# Patient Record
Sex: FEMALE | Race: WHITE | HISPANIC OR LATINO | URBAN - METROPOLITAN AREA
[De-identification: names, ages, dates, MRNs, and addresses within clinical notes are randomized per-mention and may not be internally consistent; named-entity substitution may affect disease eponyms.]

---

## 2023-11-30 ENCOUNTER — TELEPHONE (OUTPATIENT)
Dept: FAMILY MEDICINE CLINIC | Facility: CLINIC | Age: 43
End: 2023-11-30

## 2023-11-30 NOTE — TELEPHONE ENCOUNTER
VM on appt line:    Yes, good morning. My name is Sushila Mccall and I work with the [employer] and I need to rebook a annual exam for [employer]. Date of birth 9/14/2024. I'm sorry. Whoops, I messed up her birthday 1980. Sorry about that. And please give me a call back at [phone] to be able to rebook her appointment. Thank you. Spoke with Sushila Mccall, scheduled appt.

## 2024-01-30 ENCOUNTER — OFFICE VISIT (OUTPATIENT)
Dept: FAMILY MEDICINE CLINIC | Facility: CLINIC | Age: 44
End: 2024-01-30

## 2024-01-30 VITALS
BODY MASS INDEX: 25.99 KG/M2 | HEART RATE: 88 BPM | DIASTOLIC BLOOD PRESSURE: 60 MMHG | TEMPERATURE: 98.4 F | WEIGHT: 156 LBS | OXYGEN SATURATION: 97 % | HEIGHT: 65 IN | SYSTOLIC BLOOD PRESSURE: 118 MMHG | RESPIRATION RATE: 18 BRPM

## 2024-01-30 DIAGNOSIS — Z59.89 UNINSURED: ICD-10-CM

## 2024-01-30 DIAGNOSIS — R10.2 SUPRAPUBIC DISCOMFORT: ICD-10-CM

## 2024-01-30 DIAGNOSIS — Z01.419 ENCOUNTER FOR ANNUAL ROUTINE GYNECOLOGICAL EXAMINATION: Primary | ICD-10-CM

## 2024-01-30 LAB
SL AMB  POCT GLUCOSE, UA: NORMAL
SL AMB LEUKOCYTE ESTERASE,UA: 25
SL AMB POCT BILIRUBIN,UA: NEGATIVE
SL AMB POCT BLOOD,UA: ABNORMAL
SL AMB POCT CLARITY,UA: CLEAR
SL AMB POCT COLOR,UA: YELLOW
SL AMB POCT KETONES,UA: 15
SL AMB POCT NITRITE,UA: NEGATIVE
SL AMB POCT PH,UA: 5
SL AMB POCT SPECIFIC GRAVITY,UA: 1.02
SL AMB POCT URINE PROTEIN: NEGATIVE
SL AMB POCT UROBILINOGEN: NORMAL

## 2024-01-30 PROCEDURE — 99396 PREV VISIT EST AGE 40-64: CPT | Performed by: FAMILY MEDICINE

## 2024-01-30 PROCEDURE — 81002 URINALYSIS NONAUTO W/O SCOPE: CPT | Performed by: FAMILY MEDICINE

## 2024-01-30 PROCEDURE — G0145 SCR C/V CYTO,THINLAYER,RESCR: HCPCS

## 2024-01-30 PROCEDURE — G0476 HPV COMBO ASSAY CA SCREEN: HCPCS

## 2024-01-30 SDOH — ECONOMIC STABILITY - INCOME SECURITY: OTHER PROBLEMS RELATED TO HOUSING AND ECONOMIC CIRCUMSTANCES: Z59.89

## 2024-01-30 NOTE — PROGRESS NOTES
Assessment and Plan:    1. Encounter for annual routine gynecological examination  Assessment & Plan:  Normal GYN and breast exams today, emphasized the importance of annual GYN examination.  Pap with HPV performed today, await results.  Advised yearly mammograms. Rx for mammogram given.  Colon cancer screening with a colonoscopy is recommended starting at age 45 and reviewed benefits. Rx not indicated at this time.  Counseling on healthy weight: Encourage 30-40 min weight bearing exercise most days of week  Encourage at least 1200 mg calcium citrate + 2000 IUs vitamin D3 divided through diet and supplement throughout the day  I reviewed the patient’s risk factors for osteoporosis and ordered a DEXA scan if applicable  All questions have been answered to her satisfaction    Orders:  -     Mammo screening bilateral w 3d & cad; Future  -     Liquid-based pap, screening  -     HPV High Risk    2. Suprapubic discomfort  Assessment & Plan:  Pain starts in the back and moves to suprapubic area, POCT UA unremarkable, has hx of kidney stones. No CVA tenderness today  Possible kidney stone vs GYN pathology  Advised increased hydration  Would recommend imaging but patient uninsured- will refer to social work  Advised to call the office if no improvement, has follow up in 4 weeks  Consider flomax    Orders:  -     POCT urine dip    3. Uninsured      Subjective      Ann Marie Brewer is a 43 y.o. female who presents for annual GYN exam. Patient is post menopausal, underwent early menopause at age of 37yrs. Patient denies post-menopausal bleeding, spotting, or discharge.    GYN:   Denies vaginal discharge, labial erythema or lesions, dyspareunia.   Contraception: condoms.   Patient is sexually active with 1 partner.   Last pap smear was 5 yrs ago. Results were negative.   No gynecologic surgeries, only .    OB:    OB History   No obstetric history on file.        :   Denies dysuria, urinary frequency or urgency.   Reports  "pelvic discomfort that started 2-3 weeks ago.  Pain described as colicky that radiates to the front.  Has Hx of kidney stones.   Denies hematuria, flank pain, incontinence.    Breast:   Denies breast mass, discharge and overlying skin changes such as dimpling, reddening, nipple retraction.   Patient performs self breast awareness.   Patient does not have a family history of breast, endometrial, or ovarian ca.       Review of Systems  As stated in HPI.     Objective      /60 (BP Location: Left arm, Patient Position: Sitting, Cuff Size: Standard)   Pulse 88   Temp 98.4 °F (36.9 °C) (Tympanic)   Resp 18   Ht 5' 5\" (1.651 m)   Wt 70.8 kg (156 lb)   SpO2 97%   BMI 25.96 kg/m²     General:   alert and oriented, in no acute distress   Heart: regular rate and rhythm, S1, S2 normal, no murmur, click, rub or gallop   Lungs: clear to auscultation bilaterally   Abdomen: soft and tenderness in the lower abdomen/suprapubic area. No CVA tenderness.   Vulva: normal   Vagina: normal mucosa   Cervix: no cervical motion tenderness   Uterus: normal size   Adnexa: no mass, fullness, tenderness        Viji Flowers MD  Family Medicine PGY-3      "

## 2024-01-31 ENCOUNTER — TELEPHONE (OUTPATIENT)
Dept: FAMILY MEDICINE CLINIC | Facility: CLINIC | Age: 44
End: 2024-01-31

## 2024-01-31 LAB
HPV HR 12 DNA CVX QL NAA+PROBE: NEGATIVE
HPV16 DNA CVX QL NAA+PROBE: NEGATIVE
HPV18 DNA CVX QL NAA+PROBE: NEGATIVE

## 2024-02-02 LAB
LAB AP GYN PRIMARY INTERPRETATION: NORMAL
Lab: NORMAL

## 2024-02-04 PROBLEM — R10.2 SUPRAPUBIC DISCOMFORT: Status: ACTIVE | Noted: 2024-02-04

## 2024-02-04 PROBLEM — Z01.419 ENCOUNTER FOR ANNUAL ROUTINE GYNECOLOGICAL EXAMINATION: Status: ACTIVE | Noted: 2024-02-04

## 2024-02-04 NOTE — ASSESSMENT & PLAN NOTE
Normal GYN and breast exams today, emphasized the importance of annual GYN examination.  Pap with HPV performed today, await results.  Advised yearly mammograms. Rx for mammogram given.  Colon cancer screening with a colonoscopy is recommended starting at age 45 and reviewed benefits. Rx not indicated at this time.  Counseling on healthy weight: Encourage 30-40 min weight bearing exercise most days of week  Encourage at least 1200 mg calcium citrate + 2000 IUs vitamin D3 divided through diet and supplement throughout the day  I reviewed the patient’s risk factors for osteoporosis and ordered a DEXA scan if applicable  All questions have been answered to her satisfaction

## 2024-02-04 NOTE — ASSESSMENT & PLAN NOTE
Pain starts in the back and moves to suprapubic area, POCT UA unremarkable, has hx of kidney stones. No CVA tenderness today  Possible kidney stone vs GYN pathology  Advised increased hydration  Would recommend imaging but patient uninsured- will refer to social work  Advised to call the office if no improvement, has follow up in 4 weeks  Consider flomax

## 2024-02-21 PROBLEM — Z01.419 ENCOUNTER FOR ANNUAL ROUTINE GYNECOLOGICAL EXAMINATION: Status: RESOLVED | Noted: 2024-02-04 | Resolved: 2024-02-21

## 2024-05-07 ENCOUNTER — OFFICE VISIT (OUTPATIENT)
Age: 44
End: 2024-05-07

## 2024-05-07 VITALS
OXYGEN SATURATION: 99 % | DIASTOLIC BLOOD PRESSURE: 62 MMHG | SYSTOLIC BLOOD PRESSURE: 108 MMHG | WEIGHT: 157.8 LBS | HEIGHT: 66 IN | BODY MASS INDEX: 25.36 KG/M2 | HEART RATE: 78 BPM | TEMPERATURE: 98.4 F

## 2024-05-07 DIAGNOSIS — G43.E09 CHRONIC MIGRAINE WITH AURA WITHOUT STATUS MIGRAINOSUS, NOT INTRACTABLE: ICD-10-CM

## 2024-05-07 DIAGNOSIS — R10.2 PELVIC PAIN: ICD-10-CM

## 2024-05-07 DIAGNOSIS — R53.83 OTHER FATIGUE: ICD-10-CM

## 2024-05-07 DIAGNOSIS — N95.1 MENOPAUSAL SYMPTOMS: Primary | ICD-10-CM

## 2024-05-07 PROCEDURE — 99213 OFFICE O/P EST LOW 20 MIN: CPT | Performed by: FAMILY MEDICINE

## 2024-05-07 RX ORDER — TOPIRAMATE 25 MG/1
25 TABLET ORAL
Qty: 60 TABLET | Refills: 1 | Status: SHIPPED | OUTPATIENT
Start: 2024-05-07

## 2024-05-07 RX ORDER — TOPIRAMATE 50 MG/1
50 TABLET, FILM COATED ORAL
Qty: 60 TABLET | Refills: 1 | Status: SHIPPED | OUTPATIENT
Start: 2024-05-07 | End: 2024-05-07

## 2024-05-07 NOTE — ASSESSMENT & PLAN NOTE
Mainly reports headache, sweating, hot flashes, mood swings. Has hx of migraines in the past, patient feels her other symptoms are exacerbating her headaches. She also reports vaginal dryness and discomfort during intercourse. Pt had early menopause at 37yrs. Had ocassional Sx but have been worse for the past 2 months.  Recommended vaginal moisturizer such as replens  Recommended use of lubricants before intercourse  Would avoid estrogen containing products due to migraine with aura  Discussed SSRIs for menopause Tx, patient will consider

## 2024-05-07 NOTE — PATIENT INSTRUCTIONS
REPLENS VAGINAL MOISTURIZER [facility]    La menopausia   LO QUE NECESITA SABER:   ¿Qué es la menopausia? La menopausia es wenceslao fase de transición normal en la shadi de wenceslao persona cuando cesa por definitivo avilez menstruación. Se considera que está en la menopausia cuando no ha tenido la menstruación juventino un año completo después de los 40 años. La menopausia generalmente ocurre entre los 47 y 53 años de edad. La perimenopausia o pre-menopausia es un estado previo a la menopausia que puede causar signos y síntomas parecidos a la menopausia. La perimenopausia puede comenzar 4 años antes que la menopausia.       ¿Qué causa la menopausia? La menopausia comienza cuando los ovarios disminuyen la producción de hormonas femeninas conocidas chelita estrógeno y progesterona. Después de la menopausia, ya no puede quedar embarazada. Cualquiera de los siguientes factores puede desencadenar la menopausia o menopausia precoz:  Cirugía, incluso wenceslao histerectomía u ovariectomía    Antecedentes familiares de menopausia precoz    Fumar    Quimioterapia o terapia de radiación    Anomalías cromosómicas, incluyendo síndrome de [place] y síndrome de X frágil    Insuficiencia ovárica prematura (los ovarios estrella de producir óvulos antes de los 40 años)    ¿Cuáles son los signos y síntomas de la menopausia?  Ciclos menstruales irregulares con sangrado vaginal seguido de sangrado disminuido, hasta que se detenga.    Sofocos (sentirse acalorada, sudorosa y ponerse ed).    Cambios vaginales chelita mayor sequedad    Cambios en el estado de ánimo chelita ansiedad, depresión o falta de interés sexual.    Problemas para dormir, dolor en las articulaciones, franco de shakila.    Uñas quebradizas, vello en la barbilla o el pecho, donde normalmente no se presenta.    Disminución del tamaño de los senos y cambios en la textura de la piel.    Aumento de peso    ¿Cómo se trata o maneja la menopausia?  La terapia de reemplazo hormonal (TRH) es un medicamento que  "reemplaza el bajo nivel de soto hormonas. La TRH contiene estrógeno y algunas veces progestina.    La TRH tiene varios beneficios. La TRH ayuda a prevenir la osteoporosis, lo cual disminuye avilez riesgo de fracturas en los huesos. Esta terapia también la protege a usted del cáncer colorrectal.    La TRH también tiene algunos riesgos. La TRH aumenta el riesgo de padecer cáncer de mama, coágulos de adrián, enfermedades cardíacas, ataques cardíacos o derrames cerebrales. Si tiene 65 años o más, la TRH también puede aumentar el riesgo de demencia. El riesgo de cáncer de útero o de endometrio, enfermedad de la vesícula biliar e incontinencia urinaria es mayor si se sulma wenceslao TRH con estrógenos.    Sobrellevar los sofocos. Los sofocos, llamados a veces \"calores o bochornos\", son periodos cortos juventino los cuales usted siente mucho calor, suda y se pone ed. Los sofocos pueden durar un par de segundos hasta varios minutos. Pueden suceder muchas veces juventino el día y son más comunes de noche. Vístase en capas para que se pueda quitar alguna prenda con facilidad y así poder refrescarse juventino un sofoco. Las bebidas frías también pueden ayudar. Los medicamentos no hormonales pueden ayudar a aliviar o prevenir los sofocos. Por ejemplo, ciertos antidepresivos, medicamentos para los nervios y medicamentos para la presión arterial selena.    Reduzca la sequedad vaginal usando cremas vaginales de venta sin receta. La sequedad vaginal puede causarle dolor o molestias juventino el acto sexual. Use solo las cremas especiales para uso vaginal. No use gel de petróleo. Es posible que deba colocarse estrógenos en crema dentro y alrededor de la vagina. La crema de estrógeno puede reducir la sequedad vaginal y disminuir el riesgo de contraer infecciones vaginales.    Si usted no quiere quedar embarazada debe seguir usando los anticonceptivos juventino la menopausia. Usted necesita seguir utilizando anticonceptivos hasta que haya pasado 1 año de " javier terminado soto períodos. Consulte con avilez médico para que le indique cuando puede dejar de utilizar anticonceptivos para prevenir un embarazo.    ¿Cómo puedo seguir un estilo de shadi saludable juventino y después de la menopausia? Después de la menopausia, aumenta avilez riesgo de presentar enfermedades del corazón y pérdida de densidad ósea. Consulte sobre las siguientes y otras formas para mantenerse saludable:  Realice actividad física con regularidad. El ejercicio le ayuda a mantener un peso saludable. Realizar wenceslao actividad física también puede ayudarle a controlar avilez presión arterial y los niveles de colesterol. Incluya ejercicios de resistencia o levantamiento de pesas para fortalecer los huesos. Se recomienda hacer ejercicio con peso juventino al menos 30 minutos, 3 veces por semana. Pregunte a avilez médico acerca del mejor plan de ejercicio para usted.         Consuma alimentos saludables y variados. Debe incluir frutas, verduras, granos enteros (pan integral, pasta y cereales) productos lácteos descremados, y alimentos con proteínas (frijoles, carne de aves y pescado). Limite los alimentos altos en sodio (sal). Solicite con avilez médico más información sobre un plan de comidas que sea el adecuado para usted.         No fume. Si usted fuma, nunca es demasiado tarde para dejar de hacerlo. Si usted fuma, es más probable que tenga un ataque cardíaco, enfermedad pulmonar, coágulos sanguíneos o cáncer. Solicite información a avilez médico si usted necesita ayuda para dejar de fumar.    [place] suplementos según las indicaciones. Usted podría necesitar calcio y vitamina D adicionales para prevenir la osteoporosis.            Limite el consumo de alcohol y cafeína. El alcohol y la cafeína pueden empeorar soto síntomas.    ¿Cuándo dereck llamar a mi médico?  Usted tiene sangrado vaginal después de la menopausia.    Usted tiene preguntas o inquietudes acerca de avilez condición o cuidado.    ACUERDOS SOBRE AVILEZ CUIDADO:   Usted tiene el  derecho de ayudar a planear avilez cuidado. Aprenda todo lo que pueda sobre avilez condición y chelita darle tratamiento. Discuta soto opciones de tratamiento con soto médicos para decidir el cuidado que usted desea recibir. Usted siempre tiene el derecho de rechazar el tratamiento.Esta información es sólo para uso en educación. Avilez intención no es darle un consejo médico sobre enfermedades o tratamientos. Colsulte con avilez médico, enfermera o farmacéutico antes de seguir cualquier régimen médico para saber si es seguro y efectivo para usted.  © Copyright [employer] 2023 Information is for End User's use only and may not be sold, redistributed or otherwise used for commercial purposes.

## 2024-05-07 NOTE — ASSESSMENT & PLAN NOTE
Pelvic pain of several months duration, also reports dyspareunia. Patient uninsured will begin [facility]ason care application.

## 2024-05-07 NOTE — PROGRESS NOTES
Family Medicine Office Visit  Ann Marie Brewer 43 y.o. female   MRN: [id number] : 1980  ENCOUNTER: 2024    Assessment and Plan     1. Menopausal symptoms  Assessment & Plan:  Mainly reports headache, sweating, hot flashes, mood swings. Has hx of migraines in the past, patient feels her other symptoms are exacerbating her headaches. She also reports vaginal dryness and discomfort during intercourse. Pt had early menopause at 37yrs. Had ocassional Sx but have been worse for the past 2 months.  Recommended vaginal moisturizer such as replens  Recommended use of lubricants before intercourse  Would avoid estrogen containing products due to migraine with aura  Discussed SSRIs for menopause Tx, patient will consider        2. Chronic migraine with aura without status migrainosus, not intractable  Assessment & Plan:  Hx migraine, worsening over the past 2 months. + aura. Tylenol and ibuprofen help but pain returns after few hrs, Headache is constant, has had >8 headache days per month.   Trial of topamax 25mg HS  RTO in 2 weeks, dose adjustment at that time if appropriate    Orders:  -     topiramate (Topamax) 25 mg tablet; Take 1 tablet (25 mg total) by mouth daily at bedtime    3. Other fatigue  -     CBC and differential; Future  -     Comprehensive metabolic panel; Future  -     TSH, 3rd generation with Free T4 reflex; Future    4. Pelvic pain  Assessment & Plan:  Pelvic pain of several months duration, also reports dyspareunia. Patient uninsured will begin jason care application.       Orders:  -     US pelvis complete non OB; Future; Expected date: 2024        Return in about 3 weeks (around 2024), or headache follow up 2-3 weeks with Dr. Burnett.      Associated orders were discussed and explained to the patient, they expressed understanding and acceptance with A/P. Patient will call the office if any further questions/concerns.     Assessment and plan was discussed with attending  "physician    Chief Complaint     Chief Complaint   Patient presents with    OTHER     Menopause Like Sx's        History of Present Illness       Ann Marie Brewer is a 43 y.o. female who presents for evaluation of menopausal symptoms. The patient is sexually active. Patient is post menopausal, underwent early menopause at age of 37yrs. Additional symptoms include headache, insomnia, moodiness, pelvic pain, and fatigue and hot flashes .   Last Gyn testing: last pap normal.  The patient is not taking hormone therapy.       No current outpatient medications on file prior to visit.       Review of Systems     Review of Systems   Constitutional:  Negative for chills and fever.   Respiratory:  Negative for cough and shortness of breath.    Cardiovascular:  Negative for chest pain.   Gastrointestinal:  Negative for abdominal pain.   Genitourinary:  Positive for pelvic pain. Negative for difficulty urinating and vaginal discharge.   Skin:  Negative for rash.   Neurological:  Negative for headaches.       Objective     /62 (BP Location: Right arm, Patient Position: Sitting, Cuff Size: Adult)   Pulse 78   Temp 98.4 °F (36.9 °C) (Tympanic)   Ht 5' 6\" (1.676 m)   Wt 71.6 kg (157 lb 12.8 oz)   LMP  (LMP Unknown)   SpO2 99%   BMI 25.47 kg/m²     Physical Exam  Vitals reviewed.   Constitutional:       General: She is not in acute distress.     Appearance: Normal appearance.   Cardiovascular:      Rate and Rhythm: Normal rate and regular rhythm.      Heart sounds: Normal heart sounds.   Pulmonary:      Effort: Pulmonary effort is normal. No respiratory distress.      Breath sounds: Normal breath sounds.   Abdominal:      Palpations: Abdomen is soft.      Tenderness: There is no abdominal tenderness.   Musculoskeletal:      Right lower leg: No edema.      Left lower leg: No edema.   Neurological:      Mental Status: She is alert.           Viji Burnett MD  Family Medicine PGY-3  [facility]         Parts of " this note were dictated using MASYM III dictation software and may have sounds-like errors due to variation in pronunciation.

## 2024-05-07 NOTE — ASSESSMENT & PLAN NOTE
Hx migraine, worsening over the past 2 months. + aura. Tylenol and ibuprofen help but pain returns after few hrs, Headache is constant, has had >8 headache days per month.   Trial of topamax 25mg HS  RTO in 2 weeks, dose adjustment at that time if appropriate

## 2024-06-24 ENCOUNTER — OFFICE VISIT (OUTPATIENT)
Age: 44
End: 2024-06-24

## 2024-06-24 VITALS
WEIGHT: 157 LBS | TEMPERATURE: 98 F | SYSTOLIC BLOOD PRESSURE: 119 MMHG | RESPIRATION RATE: 16 BRPM | BODY MASS INDEX: 25.23 KG/M2 | DIASTOLIC BLOOD PRESSURE: 76 MMHG | HEIGHT: 66 IN | OXYGEN SATURATION: 97 % | HEART RATE: 73 BPM

## 2024-06-24 DIAGNOSIS — M25.571 ACUTE RIGHT ANKLE PAIN: Primary | ICD-10-CM

## 2024-06-24 DIAGNOSIS — G43.E09 CHRONIC MIGRAINE WITH AURA WITHOUT STATUS MIGRAINOSUS, NOT INTRACTABLE: ICD-10-CM

## 2024-06-24 PROCEDURE — 99213 OFFICE O/P EST LOW 20 MIN: CPT | Performed by: FAMILY MEDICINE

## 2024-06-24 RX ORDER — MAGNESIUM 200 MG
1 TABLET ORAL
Qty: 30 TABLET | Refills: 5 | Status: SHIPPED | OUTPATIENT
Start: 2024-06-24

## 2024-06-24 NOTE — ASSESSMENT & PLAN NOTE
Improving with topamax every other day. Cannot increase topamax dose due to symptoms of tiredness and dizziness.   Will add Mg and riboflavin to regimen.   F/U in 2 months

## 2024-06-24 NOTE — PROGRESS NOTES
Family Medicine Office Visit  Ann Marie Brewer 43 y.o. female   MRN: [id number] : 1980  ENCOUNTER: 2024    Assessment and Plan     1. Acute right ankle pain  Comments:  Patient fell and hurt ankle, per [place] rules patient qualifies for xray so we will send it today. Continue using ankle brace, RICE  Orders:  -     XR ankle 2 vw right; Future; Expected date: 2024  2. Chronic migraine with aura without status migrainosus, not intractable  Assessment & Plan:  Improving with topamax every other day. Cannot increase topamax dose due to symptoms of tiredness and dizziness.   Will add Mg and riboflavin to regimen.   F/U in 2 months  Orders:  -     Magnesium 200 MG TABS; Take 1 tablet (200 mg total) by mouth daily at bedtime  -     Riboflavin 50 MG TABS; Take 1 tablet (50 mg total) by mouth daily at bedtime    Associated orders were discussed and explained to the patient, they expressed understanding and acceptance with A/P. Patient will call the office if any further questions/concerns.     Assessment and plan was discussed with attending physician    Chief Complaint     Chief Complaint   Patient presents with    Follow-up     Headaches are improving       History of Present Illness     Ann Marie Brewer is a 43 y.o.-year-old female who presents today for headache follow up.  She was taking topamax nightly with improvement of headaches but it was causing fatigue and dizziness so she decreased the medication to every other day. She reports improvement in headaches and sleep despite decreasing to every other day dosing. She was feeling dizzy one day and fell hurting her ankle. She can ambulate and bear weight but there is pain with movement.       Current Outpatient Medications on File Prior to Visit   Medication Sig    topiramate (Topamax) 25 mg tablet Take 1 tablet (25 mg total) by mouth daily at bedtime       Review of Systems     Review of Systems   Constitutional:  Negative for chills and fever.  "  Respiratory:  Negative for cough and shortness of breath.    Cardiovascular:  Negative for chest pain.   Gastrointestinal:  Negative for abdominal pain.   Genitourinary:  Negative for difficulty urinating.   Skin:  Negative for rash.   Neurological:  Positive for headaches (improving).       Objective     /76 (BP Location: Right arm, Patient Position: Sitting, Cuff Size: Adult)   Pulse 73   Temp 98 °F (36.7 °C) (Tympanic)   Resp 16   Ht 5' 6\" (1.676 m)   Wt 71.2 kg (157 lb)   SpO2 97%   BMI 25.34 kg/m²     Physical Exam  Vitals reviewed.   Constitutional:       General: She is not in acute distress.     Appearance: Normal appearance.   Cardiovascular:      Rate and Rhythm: Normal rate and regular rhythm.      Heart sounds: Normal heart sounds.   Pulmonary:      Effort: Pulmonary effort is normal. No respiratory distress.      Breath sounds: Normal breath sounds.   Abdominal:      Palpations: Abdomen is soft.      Tenderness: There is no abdominal tenderness.   Musculoskeletal:      Right lower leg: No edema.      Left lower leg: No edema.      Right ankle: No swelling. Tenderness present over the lateral malleolus (right above lateral malleolus).   Neurological:      Mental Status: She is alert.         Viji Burnett MD  Family Medicine PGY-3  [facility]         Parts of this note were dictated using M*Modal dictation software and may have sounds-like errors due to variation in pronunciation.  "

## 2024-11-13 ENCOUNTER — OFFICE VISIT (OUTPATIENT)
Age: 44
End: 2024-11-13

## 2024-11-13 VITALS
DIASTOLIC BLOOD PRESSURE: 80 MMHG | RESPIRATION RATE: 16 BRPM | TEMPERATURE: 97.1 F | HEART RATE: 73 BPM | OXYGEN SATURATION: 97 % | SYSTOLIC BLOOD PRESSURE: 114 MMHG

## 2024-11-13 DIAGNOSIS — H10.811 PINGUECULITIS OF RIGHT EYE: Primary | ICD-10-CM

## 2024-11-13 PROCEDURE — 99213 OFFICE O/P EST LOW 20 MIN: CPT | Performed by: FAMILY MEDICINE

## 2024-11-13 RX ORDER — TOPIRAMATE 25 MG/1
25 TABLET, FILM COATED ORAL
Qty: 60 TABLET | Refills: 1 | Status: CANCELLED | OUTPATIENT
Start: 2024-11-13

## 2024-11-13 RX ORDER — CARBOXYMETHYLCELLULOSE SODIUM 5 MG/ML
1 SOLUTION/ DROPS OPHTHALMIC 3 TIMES DAILY PRN
Qty: 50 EACH | Refills: 0 | Status: SHIPPED | OUTPATIENT
Start: 2024-11-13

## 2024-11-13 RX ORDER — KETOROLAC TROMETHAMINE 4 MG/ML
1 SOLUTION/ DROPS OPHTHALMIC 4 TIMES DAILY
Qty: 5 ML | Refills: 0 | Status: SHIPPED | OUTPATIENT
Start: 2024-11-13

## 2024-11-13 NOTE — PROGRESS NOTES
Ambulatory Visit  Name: Ann Marie Brewer      : 1980      MRN: [id number]  Encounter Provider: Mirna Kuhn MD  Encounter Date: 2024   Encounter department: [facility]    Assessment & Plan  Pingueculitis of right eye  States that she has had sharp right eye and right sided facial pain for the past 5 days. States that the medial aspect of her right eye is itchy and painful. Has been using motrin which is a bit helpful. Has hx of headaches which came after the eye pain.  Denies fevers.    On physical exam, redness noted in medial aspect of eye with central yellow/whitish bump.    Vision screening 20/20 bilaterally.     Ketorolac eyedrops in right eye for inflammation for up to 4 times a day  Can also use tears at least 10 minutes apart from ketorolac eyedrop  Monitor for worsening vision and return precautions given  Orders:    ketorolac (ACULAR) 0.4 % SOLN; Administer 1 drop to the right eye 4 (four) times a day    carboxymethylcellulose 0.5 % SOLN; Administer 1 drop to the right eye 3 (three) times a day as needed for dry eyes    BMI Counseling: There is no height or weight on file to calculate BMI. The BMI is above normal. Nutrition recommendations include decreasing portion sizes, encouraging healthy choices of fruits and vegetables, decreasing fast food intake, consuming healthier snacks, increasing intake of lean protein and reducing intake of saturated and trans fat. Exercise recommendations include exercising 3-5 times per week. Rationale for BMI follow-up plan is due to patient being overweight or obese.       History of Present Illness     Here for right eye pain.        History obtained from : patient and patient's Significant Other  Review of Systems   Constitutional:  Negative for chills and fever.   HENT:  Negative for ear pain and sore throat.    Eyes:  Positive for pain (right) and redness (right medial eye). Negative for visual disturbance.    Respiratory:  Negative for cough and shortness of breath.    Cardiovascular:  Negative for chest pain and palpitations.   Gastrointestinal:  Negative for abdominal pain and vomiting.   Genitourinary:  Negative for dysuria and hematuria.   Musculoskeletal:  Negative for arthralgias and back pain.   Skin:  Negative for color change and rash.   Neurological:  Negative for seizures and syncope.   All other systems reviewed and are negative.    Pertinent Medical History         Medical History Reviewed by provider this encounter:  Tobacco  Allergies  Meds  Problems  Med Hx  Surg Hx  Fam Hx       Past Medical History   Past Medical History:   Diagnosis Date    Chronic migraine without aura      Past Surgical History:   Procedure Laterality Date     SECTION       History reviewed. No pertinent family history.  Current Outpatient Medications on File Prior to Visit   Medication Sig Dispense Refill    Magnesium 200 MG TABS Take 1 tablet (200 mg total) by mouth daily at bedtime 30 tablet 5    Riboflavin 50 MG TABS Take 1 tablet (50 mg total) by mouth daily at bedtime (Patient not taking: Reported on 2024) 30 tablet 5    [DISCONTINUED] topiramate (Topamax) 25 mg tablet Take 1 tablet (25 mg total) by mouth daily at bedtime 60 tablet 1     No current facility-administered medications on file prior to visit.   No Known Allergies   Current Outpatient Medications on File Prior to Visit   Medication Sig Dispense Refill    Magnesium 200 MG TABS Take 1 tablet (200 mg total) by mouth daily at bedtime 30 tablet 5    Riboflavin 50 MG TABS Take 1 tablet (50 mg total) by mouth daily at bedtime (Patient not taking: Reported on 2024) 30 tablet 5    [DISCONTINUED] topiramate (Topamax) 25 mg tablet Take 1 tablet (25 mg total) by mouth daily at bedtime 60 tablet 1     No current facility-administered medications on file prior to visit.      Social History     Tobacco Use    Smoking status: Never     Passive exposure:  Never    Smokeless tobacco: Never   Vaping Use    Vaping status: Never Used   Substance and Sexual Activity    Alcohol use: Never    Drug use: Never    Sexual activity: Yes     Partners: Male         Objective     /80 (Patient Position: Sitting, Cuff Size: Large)   Pulse 73   Temp (!) 97.1 °F (36.2 °C)   Resp 16   SpO2 97%     Physical Exam  Constitutional:       Appearance: Normal appearance.   HENT:      Head: Normocephalic and atraumatic.      Mouth/Throat:      Mouth: Mucous membranes are moist.   Eyes:      General: Lids are normal. Lids are everted, no foreign bodies appreciated.         Right eye: No discharge.         Left eye: No discharge.      Extraocular Movements: Extraocular movements intact.      Conjunctiva/sclera:      Right eye: Right conjunctiva is injected.     Cardiovascular:      Rate and Rhythm: Normal rate and regular rhythm.      Pulses: Normal pulses.      Heart sounds: Normal heart sounds.   Pulmonary:      Effort: Pulmonary effort is normal. No respiratory distress.      Breath sounds: Normal breath sounds.   Abdominal:      General: Bowel sounds are normal.      Palpations: Abdomen is soft.      Tenderness: There is no abdominal tenderness.   Musculoskeletal:      Cervical back: Neck supple.      Right lower leg: No edema.      Left lower leg: No edema.   Skin:     General: Skin is warm and dry.      Capillary Refill: Capillary refill takes less than 2 seconds.   Neurological:      Mental Status: She is alert.

## 2024-11-22 ENCOUNTER — OFFICE VISIT (OUTPATIENT)
Age: 44
End: 2024-11-22

## 2024-11-22 VITALS
HEIGHT: 66 IN | TEMPERATURE: 98 F | OXYGEN SATURATION: 98 % | HEART RATE: 61 BPM | WEIGHT: 168.7 LBS | RESPIRATION RATE: 19 BRPM | DIASTOLIC BLOOD PRESSURE: 84 MMHG | SYSTOLIC BLOOD PRESSURE: 138 MMHG | BODY MASS INDEX: 27.11 KG/M2

## 2024-11-22 DIAGNOSIS — Z23 ENCOUNTER FOR IMMUNIZATION: ICD-10-CM

## 2024-11-22 DIAGNOSIS — Z11.4 SCREENING FOR HIV (HUMAN IMMUNODEFICIENCY VIRUS): ICD-10-CM

## 2024-11-22 DIAGNOSIS — Z00.00 ANNUAL PHYSICAL EXAM: Primary | ICD-10-CM

## 2024-11-22 DIAGNOSIS — Z12.31 ENCOUNTER FOR SCREENING MAMMOGRAM FOR BREAST CANCER: ICD-10-CM

## 2024-11-22 DIAGNOSIS — Z11.59 NEED FOR HEPATITIS C SCREENING TEST: ICD-10-CM

## 2024-11-22 DIAGNOSIS — Z12.11 SCREENING FOR COLON CANCER: ICD-10-CM

## 2024-11-22 PROCEDURE — 99386 PREV VISIT NEW AGE 40-64: CPT | Performed by: FAMILY MEDICINE

## 2024-11-22 PROCEDURE — 90471 IMMUNIZATION ADMIN: CPT | Performed by: FAMILY MEDICINE

## 2024-11-22 PROCEDURE — 90656 IIV3 VACC NO PRSV 0.5 ML IM: CPT | Performed by: FAMILY MEDICINE

## 2024-11-22 NOTE — PROGRESS NOTES
Adult Annual Physical  Name: Ann Marie Brewer      : 1980      MRN: [id number]  Encounter Provider: Deya Tomlinson MD  Encounter Date: 2024   Encounter department: [facility]    Assessment & Plan  Annual physical exam         Need for hepatitis C screening test    Orders:    Hepatitis C Antibody; Future    Screening for HIV (human immunodeficiency virus)    Orders:    HIV 1/2 AG/AB w Reflex [facility] for 2 yr old and above; Future    Encounter for immunization    Orders:    influenza vaccine preservative-free 0.5 mL IM (Fluzone, Afluria, Fluarix, Flulaval)    Encounter for screening mammogram for breast cancer    Orders:    Mammo screening bilateral w 3d and cad; Future    Screening for colon cancer    Orders:    Ambulatory Referral to Gastroenterology; Future    Immunizations and preventive care screenings were discussed with patient today. Appropriate education was printed on patient's after visit summary.    Counseling:  Alcohol/drug use: discussed moderation in alcohol intake, the recommendations for healthy alcohol use, and avoidance of illicit drug use.  Dental Health: discussed importance of regular tooth brushing, flossing, and dental visits.  Injury prevention: discussed safety/seat belts, safety helmets, smoke detectors, carbon monoxide detectors, and smoking near bedding or upholstery.  Sexual health: discussed sexually transmitted diseases, partner selection, use of condoms, avoidance of unintended pregnancy, and contraceptive alternatives.  Exercise: the importance of regular exercise/physical activity was discussed. Recommend exercise 3-5 times per week for at least 30 minutes.     BMI Counseling: Body mass index is 27.23 kg/m². The BMI is above normal. Nutrition recommendations include encouraging healthy choices of fruits and vegetables, decreasing fast food intake, consuming healthier snacks, limiting drinks that contain sugar and reducing intake of  cholesterol. Exercise recommendations include moderate physical activity 150 minutes/week and exercising 3-5 times per week. Rationale for BMI follow-up plan is due to patient being overweight or obese.     Depression Screening and Follow-up Plan: Patient was screened for depression during today's encounter. They screened negative with a PHQ-2 score of 0.        History of Present Illness     Adult Annual Physical:  Patient presents for annual physical.     Diet and Physical Activity:  - Diet/Nutrition: well balanced diet, portion control, heart healthy (low sodium) diet and limited junk food.  - Exercise: walking and 3-4 times a week on average.    Depression Screening:  - PHQ-2 Score: 0    General Health:  - Sleep: 7-8 hours of sleep on average and sleeps well.  - Vision: no vision problems and goes for regular eye exams.  - Dental: regular dental visits.    /GYN Health:  - Follows with GYN: no.   - History of STDs: no  - Contraception:. none      Review of Systems   Constitutional:  Negative for chills and fever.   HENT:  Negative for ear pain and sore throat.    Eyes:  Negative for pain and visual disturbance.   Respiratory:  Negative for cough and shortness of breath.    Cardiovascular:  Negative for chest pain and palpitations.   Gastrointestinal:  Negative for abdominal pain and vomiting.   Genitourinary:  Negative for dysuria and hematuria.   Musculoskeletal:  Negative for arthralgias and back pain.   Skin:  Negative for color change and rash.   Neurological:  Negative for seizures and syncope.   All other systems reviewed and are negative.    Pertinent Medical History     Medical History Reviewed by provider this encounter:     .  Past Medical History   Past Medical History:   Diagnosis Date    Chronic migraine without aura      Past Surgical History:   Procedure Laterality Date     SECTION       History reviewed. No pertinent family history.   reports that she has never smoked. She has never been  "exposed to tobacco smoke. She has never used smokeless tobacco. She reports that she does not drink alcohol and does not use drugs.  Current Outpatient Medications on File Prior to Visit   Medication Sig Dispense Refill    carboxymethylcellulose 0.5 % SOLN Administer 1 drop to the right eye 3 (three) times a day as needed for dry eyes 50 each 0    ketorolac (ACULAR) 0.4 % SOLN Administer 1 drop to the right eye 4 (four) times a day 5 mL 0    Magnesium 200 MG TABS Take 1 tablet (200 mg total) by mouth daily at bedtime 30 tablet 5    Riboflavin 50 MG TABS Take 1 tablet (50 mg total) by mouth daily at bedtime (Patient not taking: Reported on 11/22/2024) 30 tablet 5     No current facility-administered medications on file prior to visit.   No Known Allergies   Current Outpatient Medications on File Prior to Visit   Medication Sig Dispense Refill    carboxymethylcellulose 0.5 % SOLN Administer 1 drop to the right eye 3 (three) times a day as needed for dry eyes 50 each 0    ketorolac (ACULAR) 0.4 % SOLN Administer 1 drop to the right eye 4 (four) times a day 5 mL 0    Magnesium 200 MG TABS Take 1 tablet (200 mg total) by mouth daily at bedtime 30 tablet 5    Riboflavin 50 MG TABS Take 1 tablet (50 mg total) by mouth daily at bedtime (Patient not taking: Reported on 11/22/2024) 30 tablet 5     No current facility-administered medications on file prior to visit.      Social History     Tobacco Use    Smoking status: Never     Passive exposure: Never    Smokeless tobacco: Never   Vaping Use    Vaping status: Never Used   Substance and Sexual Activity    Alcohol use: Never    Drug use: Never    Sexual activity: Yes     Partners: Male       Objective   /84 (BP Location: Right arm, Patient Position: Sitting, Cuff Size: Standard)   Pulse 61   Temp 98 °F (36.7 °C) (Axillary)   Resp 19   Ht 5' 6\" (1.676 m)   Wt 76.5 kg (168 lb 11.2 oz)   SpO2 98%   BMI 27.23 kg/m²     Physical Exam  Vitals and nursing note reviewed. "   Constitutional:       General: She is not in acute distress.     Appearance: She is well-developed.   HENT:      Head: Normocephalic and atraumatic.   Eyes:      Conjunctiva/sclera: Conjunctivae normal.   Cardiovascular:      Rate and Rhythm: Normal rate and regular rhythm.      Heart sounds: No murmur heard.  Pulmonary:      Effort: Pulmonary effort is normal. No respiratory distress.      Breath sounds: Normal breath sounds.   Abdominal:      Palpations: Abdomen is soft.      Tenderness: There is no abdominal tenderness.   Musculoskeletal:         General: No swelling.      Cervical back: Neck supple.   Skin:     General: Skin is warm and dry.      Capillary Refill: Capillary refill takes less than 2 seconds.   Neurological:      General: No focal deficit present.      Mental Status: She is alert and oriented to person, place, and time. Mental status is at baseline.   Psychiatric:         Mood and Affect: Mood normal.         Behavior: Behavior normal.         Thought Content: Thought content normal.         Judgment: Judgment normal.

## 2025-02-04 ENCOUNTER — TELEPHONE (OUTPATIENT)
Dept: OTHER | Facility: OTHER | Age: 45
End: 2025-02-04

## 2025-02-04 NOTE — TELEPHONE ENCOUNTER
Outreach Reason: [place] Ceed Screening Event:    Outreach made to patient regarding Mammogram screening event March 1st at [facility] Radiology department from 9-2pm. Information provided and patient encouraged to call [facility] office to determine eligibility for free Breast Cancer Screening.    Contact information: Phone:[phone] to qualify them for our CEED program for a free Mammogram.      Hope to see you at the event.     Left message regarding above.